# Patient Record
Sex: MALE | Race: BLACK OR AFRICAN AMERICAN | Employment: FULL TIME | URBAN - METROPOLITAN AREA
[De-identification: names, ages, dates, MRNs, and addresses within clinical notes are randomized per-mention and may not be internally consistent; named-entity substitution may affect disease eponyms.]

---

## 2018-12-13 ENCOUNTER — HOSPITAL ENCOUNTER (EMERGENCY)
Age: 23
Discharge: HOME | End: 2018-12-13

## 2018-12-13 VITALS
TEMPERATURE: 98.1 F | DIASTOLIC BLOOD PRESSURE: 58 MMHG | HEIGHT: 72 IN | RESPIRATION RATE: 20 BRPM | WEIGHT: 161.16 LBS | HEART RATE: 93 BPM | SYSTOLIC BLOOD PRESSURE: 116 MMHG | BODY MASS INDEX: 21.83 KG/M2 | OXYGEN SATURATION: 99 %

## 2018-12-13 DIAGNOSIS — G40.909 SEIZURE DISORDER (HCC): Primary | ICD-10-CM

## 2018-12-13 PROCEDURE — 99284 EMERGENCY DEPT VISIT MOD MDM: CPT

## 2018-12-13 RX ORDER — DIVALPROEX SODIUM 500 MG/1
1500 TABLET, DELAYED RELEASE ORAL DAILY
Qty: 90 TABLET | Refills: 2 | Status: SHIPPED | OUTPATIENT
Start: 2018-12-13 | End: 2019-04-17 | Stop reason: ALTCHOICE

## 2018-12-13 RX ORDER — DIVALPROEX SODIUM 500 MG/1
1500 TABLET, DELAYED RELEASE ORAL DAILY
COMMUNITY
End: 2018-12-13

## 2018-12-13 ASSESSMENT — PAIN DESCRIPTION - DESCRIPTORS: DESCRIPTORS: ACHING

## 2018-12-13 ASSESSMENT — PAIN DESCRIPTION - PROGRESSION: CLINICAL_PROGRESSION: NOT CHANGED

## 2018-12-13 ASSESSMENT — PAIN DESCRIPTION - ONSET: ONSET: SUDDEN

## 2018-12-13 ASSESSMENT — PAIN SCALES - GENERAL
PAINLEVEL_OUTOF10: 6
PAINLEVEL_OUTOF10: 8

## 2018-12-13 ASSESSMENT — PAIN DESCRIPTION - LOCATION: LOCATION: HEAD

## 2018-12-13 ASSESSMENT — PAIN DESCRIPTION - FREQUENCY: FREQUENCY: CONTINUOUS

## 2018-12-13 NOTE — ED NOTES
Bed: A-16  Expected date: 12/13/18  Expected time: 9:20 AM  Means of arrival: [facility]  Comments:  [facility] [id number], RN  12/13/18 8698

## 2018-12-13 NOTE — ED NOTES
Pt to room 16 via ems with c/o seizure this morning witnessed by his roommate. EMS reports pt was drowsy en route, but awake at this time.        Gavin Ortiz RN  12/13/18 1363

## 2018-12-14 ASSESSMENT — ENCOUNTER SYMPTOMS
GASTROINTESTINAL NEGATIVE: 1
RESPIRATORY NEGATIVE: 1

## 2019-01-07 ENCOUNTER — APPOINTMENT (OUTPATIENT)
Dept: CT IMAGING | Age: 24
End: 2019-01-07

## 2019-01-07 ENCOUNTER — HOSPITAL ENCOUNTER (EMERGENCY)
Age: 24
Discharge: HOME | End: 2019-01-07
Attending: EMERGENCY MEDICINE

## 2019-01-07 VITALS
SYSTOLIC BLOOD PRESSURE: 122 MMHG | BODY MASS INDEX: 22.28 KG/M2 | RESPIRATION RATE: 16 BRPM | DIASTOLIC BLOOD PRESSURE: 65 MMHG | HEART RATE: 57 BPM | WEIGHT: 164.24 LBS | TEMPERATURE: 98.2 F | OXYGEN SATURATION: 100 %

## 2019-01-07 DIAGNOSIS — S02.92XA CLOSED FRACTURE OF FACIAL BONE DUE TO FALL, INITIAL ENCOUNTER (HCC): Primary | ICD-10-CM

## 2019-01-07 DIAGNOSIS — R56.9 SEIZURE (HCC): ICD-10-CM
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
## 2019-01-07 PROCEDURE — 99284 EMERGENCY DEPT VISIT MOD MDM: CPT

## 2019-01-07 PROCEDURE — 6370000000 HC RX 637 (ALT 250 FOR IP): Performed by: EMERGENCY MEDICINE

## 2019-01-07 PROCEDURE — 70486 CT MAXILLOFACIAL W/O DYE: CPT

## 2019-01-07 RX ORDER — IBUPROFEN 600 MG/1
600 TABLET ORAL EVERY 8 HOURS PRN
Qty: 30 TABLET | Refills: 0 | Status: SHIPPED | OUTPATIENT
Start: 2019-01-07 | End: 2019-04-17 | Stop reason: ALTCHOICE

## 2019-01-07 RX ORDER — DIVALPROEX SODIUM 250 MG/1
500 TABLET, DELAYED RELEASE ORAL ONCE
Status: DISCONTINUED | OUTPATIENT
Start: 2019-01-07 | End: 2019-01-07 | Stop reason: HOSPADM

## 2019-01-07 RX ADMIN — IBUPROFEN 600 MG: 200 TABLET, FILM COATED ORAL at 01:11

## 2019-01-07 ASSESSMENT — PAIN SCALES - GENERAL
PAINLEVEL_OUTOF10: 4
PAINLEVEL_OUTOF10: 7

## 2019-04-17 ENCOUNTER — APPOINTMENT (OUTPATIENT)
Dept: GENERAL RADIOLOGY | Age: 24
End: 2019-04-17

## 2019-04-17 ENCOUNTER — HOSPITAL ENCOUNTER (EMERGENCY)
Age: 24
Discharge: HOME | End: 2019-04-17

## 2019-04-17 VITALS
WEIGHT: 165.57 LBS | RESPIRATION RATE: 14 BRPM | OXYGEN SATURATION: 99 % | TEMPERATURE: 98.6 F | HEIGHT: 73 IN | SYSTOLIC BLOOD PRESSURE: 106 MMHG | BODY MASS INDEX: 21.94 KG/M2 | DIASTOLIC BLOOD PRESSURE: 65 MMHG | HEART RATE: 63 BPM

## 2019-04-17 DIAGNOSIS — S49.92XA SHOULDER INJURY, LEFT, INITIAL ENCOUNTER: ICD-10-CM

## 2019-04-17 DIAGNOSIS — S39.012A LUMBAR STRAIN, INITIAL ENCOUNTER: ICD-10-CM

## 2019-04-17 DIAGNOSIS — G40.919 BREAKTHROUGH SEIZURE (HCC): Primary | ICD-10-CM

## 2019-04-17 PROCEDURE — 6370000000 HC RX 637 (ALT 250 FOR IP): Performed by: PHYSICIAN ASSISTANT

## 2019-04-17 PROCEDURE — 73030 X-RAY EXAM OF SHOULDER: CPT

## 2019-04-17 PROCEDURE — 99283 EMERGENCY DEPT VISIT LOW MDM: CPT

## 2019-04-17 RX ORDER — IBUPROFEN 600 MG/1
600 TABLET ORAL EVERY 6 HOURS PRN
Qty: 20 TABLET | Refills: 0 | Status: SHIPPED | OUTPATIENT
Start: 2019-04-17

## 2019-04-17 RX ORDER — ZONISAMIDE 100 MG/1
100 CAPSULE ORAL DAILY
COMMUNITY
End: 2019-09-28 | Stop reason: ALTCHOICE

## 2019-04-17 RX ORDER — IBUPROFEN 600 MG/1
600 TABLET ORAL ONCE
Status: COMPLETED | OUTPATIENT
Start: 2019-04-17 | End: 2019-04-17

## 2019-04-17 RX ORDER — CYCLOBENZAPRINE HCL 10 MG
10 TABLET ORAL NIGHTLY PRN
Qty: 20 TABLET | Refills: 0 | Status: SHIPPED | OUTPATIENT
Start: 2019-04-17 | End: 2019-04-27

## 2019-04-17 RX ADMIN — IBUPROFEN 600 MG: 600 TABLET ORAL at 17:17

## 2019-04-17 ASSESSMENT — PAIN - FUNCTIONAL ASSESSMENT: PAIN_FUNCTIONAL_ASSESSMENT: 0-10

## 2019-04-17 ASSESSMENT — PAIN DESCRIPTION - ONSET: ONSET: SUDDEN

## 2019-04-17 ASSESSMENT — PAIN SCALES - GENERAL
PAINLEVEL_OUTOF10: 4
PAINLEVEL_OUTOF10: 4
PAINLEVEL_OUTOF10: 2
PAINLEVEL_OUTOF10: 2

## 2019-04-17 ASSESSMENT — PAIN DESCRIPTION - LOCATION
LOCATION: ARM;BACK;HEAD
LOCATION: ARM
LOCATION: HEAD;ARM;BACK

## 2019-04-17 ASSESSMENT — PAIN DESCRIPTION - DESCRIPTORS
DESCRIPTORS: ACHING
DESCRIPTORS: ACHING

## 2019-04-17 ASSESSMENT — PAIN DESCRIPTION - PROGRESSION
CLINICAL_PROGRESSION: NOT CHANGED
CLINICAL_PROGRESSION: GRADUALLY IMPROVING

## 2019-04-17 ASSESSMENT — PAIN DESCRIPTION - PAIN TYPE
TYPE: ACUTE PAIN
TYPE: ACUTE PAIN

## 2019-04-17 ASSESSMENT — PAIN DESCRIPTION - FREQUENCY: FREQUENCY: CONTINUOUS

## 2019-04-17 NOTE — ED PROVIDER NOTES
[address] [facility] [place] [address] [address] [id number]  Dept: [phone]  Loc: [address] ENCOUNTER    Evaluated by the Advanced Practice Provider      CHIEF COMPLAINT  Chief Complaint   Patient presents with    Seizures     Had a Seizure approximately 2 hours ago. Was alone and in the bed. Felt self shaking. Woke up with sore arms, head and back. States did not hit head on anything       HPI    Gary Mcmanus is a 26 y.o. male who presents with a seizure that occurred 2 hours prior to arrival.  The quality of the seizure was unknown. The duration of the seizure was unkown. The context was that the patient was lying around in bed and woke up with pain in his left shoulder and lower back. He states that he is pretty sure he had a seizure. He is currently taking Zonegran, states he has not missed any doses. He states that his been on Depakote in the past but was switched to [address] by his neurologist.  He states that he needs referral to adult neurologist as he is now 26.    REVIEW OF SYSTEMS    Neurologic: see HPI, No bowel or bladder incontinence, No saddle anesthesia, No leg weakness  Cardiac: No chest pain or palpitations  Respiratory: No shortness of breath or new cough  General: No fevers or chills  : No dysuria or hematuria  GI: No vomiting or diarrhea  Musculoskeletal: see HPI  All other systems reviewed and are negative. PAST MEDICAL OR SURGICAL HISTORY    Past Medical History:   Diagnosis Date    Seizures ([facility] [address]      History reviewed. No pertinent surgical history. CURRENT MEDICATIONS  (may include discharge medications prescribed in the ED)  Current Outpatient Rx   Medication Sig Dispense Refill    zonisamide (ZONEGRAN) 100 MG capsule Take 100 mg by mouth daily         ALLERGIES    Allergies   Allergen Reactions    Amoxicillin Rash       FAMILY OR SOCIAL HISTORY    History reviewed.  No pertinent family history.   Social History     Socioeconomic History    Marital status: Single     Spouse name: None    Number of children: None    Years of education: None    Highest education level: None   Occupational History    None   Social Needs    Financial resource strain: None    Food insecurity:     Worry: None     Inability: None    Transportation needs:     Medical: None     Non-medical: None   Tobacco Use    Smoking status: Never Smoker    Smokeless tobacco: Never Used   Substance and Sexual Activity    Alcohol use: No    Drug use: Not Currently    Sexual activity: Yes     Partners: Female   Lifestyle    Physical activity:     Days per week: None     Minutes per session: None    Stress: None   Relationships    Social connections:     Talks on phone: None     Gets together: None     Attends Sikhism service: None     Active member of club or organization: None     Attends meetings of clubs or organizations: None     Relationship status: None    Intimate partner violence:     Fear of current or ex partner: None     Emotionally abused: None     Physically abused: None     Forced sexual activity: None   Other Topics Concern    None   Social History Narrative    None       PHYSICAL EXAM    VITAL SIGNS: /81   Pulse 78   Temp 98.2 °F (36.8 °C) (Oral)   Resp 14   Ht 6' 1\" (1.854 m)   Wt 165 lb 9.1 oz (75.1 kg)   SpO2 100%   BMI 21.84 kg/m²   Constitutional:  Well developed, well nourished, no acute distress  Eyes:  Pupils equally round and reactive to light, sclera nonicteric  HENT:  External ears normal, nose normal, oropharynx moist, tongue normal  NECK: Normal range of motion, no tenderness  Respiratory: Lungs clear to auscultation bilaterally, no respiratory distress, no wheezing   Cardiovascular:  regular rate, regular rhythm, no murmurs   GI:  Soft, nondistended, normal bowel sounds, nontender  Musculoskeletal:  Exam of the affected shoulder reveals mild tenderness to palpation over the deltoid, no obvious deformity, passive and active range of motion intact. No increased warmth or joint effusion    Back: +mild bilateral lumbar paraspinous tenderness to palpation  Integument:  Skin is warm and dry, no obvious rash   Neurologic: Awake, alert, oriented, no aphasia, no slurred speech, CN II-XII intact, normal finger to nose test bilaterally, intact thigh adduction, knee flexion and extension, ankle dorsiflexion, toe plantarflexion, and great toe dorsiflexion bilaterally; patellar reflexes are 2+ and equal bilaterally, sensation to light touch is intact in the groin and lower extremities bilaterally; axillary nerve is intact and the median/ulnar/radial nerve sensory and motor distributions are intact on the affected side  Vascular: Radial and DP pulses 2+ and equal bilaterally  Psych: Pleasant affect, no hallucinations    RADIOLOGY  XR SHOULDER LEFT (MIN 2 VIEWS)   Final Result   No acute abnormality. ED COURSE & MEDICAL DECISION MAKING    Pertinent Labs & Imaging studies reviewed and interpreted. (See chart for details)  See chart for details of medications given during the ED stay. Differential diagnosis: status epilepticus, breakthrough seizure, intracranial bleed, brain tumor, hypoglycemia, neck fracture or other orthopedic fractures, posterior shoulder dislocation, tongue laceration, other; Cauda Equina Syndrome, Spinal Cord Compression, Conus Medullaris Syndrome, ruptured/dissecting Abdominal Aortic Aneurysm, Fracture or dislocation, other; rotator cuff sprain/tear/rupture, clavicle fracture, humerus fracture, scapular fracture, posterior or anterior glenohumeral joint dislocation, glenohumeral subluxation, AC joint separation, brachioplexus injury or other peripheral nerve injury, cervical spine neurologic or mechanical bony injury, left apical pneumothorax, arterial injury/Ischemia, Infection, other    Patient is afebrile and nontoxic in appearance. Neuro exam unremarkable. Patient states he is taking his Zonegran as prescribed. Had a breakthrough seizure today. Due to the absence of neurological deficit, I have low suspicion at this time for epidural compression syndrome. Patient's back pain is most likely due to muscular strain. Plain films of the shoulder as above. If symptoms do not improve, patient will need further evaluation by orthopedics for possible rotator cuff tear. A sling was placed in the emergency department, I instructed the patient on intermittent use to avoid frozen shoulder. Consultation with neurology at 6:30 PM: I spoke with Dr. Ze Rowe, and he said the patient can call the office to schedule an appointment. Reevaluation at 6:35 PM: Patient is resting comfortably. I believe the patient is safe for discharge at this time. The patient was instructed to follow up as an outpatient in 2 days. The patient was instructed to return to the ED immediately for any new or worsening symptoms. The patient verbalized understanding. I have evaluated this patient. My supervising physician was available for consultation. FINAL IMPRESSION    1. Breakthrough seizure ([address]    2. Shoulder injury, left, initial encounter    3.  Lumbar strain, initial encounter        PLAN  Discharge with outpatient follow-up (see EMR)    (Please note that this note was completed with a voice recognition program.  Every attempt was made to edit the dictations, but inevitably there remain words that are mis-transcribed.)          Rayna Gomesma  04/17/19 8839

## 2019-09-28 ENCOUNTER — HOSPITAL ENCOUNTER (EMERGENCY)
Age: 24
Discharge: HOME | End: 2019-09-28
Attending: EMERGENCY MEDICINE

## 2019-09-28 VITALS
BODY MASS INDEX: 22.99 KG/M2 | SYSTOLIC BLOOD PRESSURE: 113 MMHG | TEMPERATURE: 99.4 F | WEIGHT: 173.5 LBS | HEIGHT: 73 IN | OXYGEN SATURATION: 99 % | RESPIRATION RATE: 18 BRPM | HEART RATE: 102 BPM | DIASTOLIC BLOOD PRESSURE: 67 MMHG

## 2019-09-28 DIAGNOSIS — G40.919 BREAKTHROUGH SEIZURE (HCC): Primary | ICD-10-CM

## 2019-09-28 LAB — VALPROIC ACID LEVEL: <2.8 UG/ML (ref 50–100)

## 2019-09-28 PROCEDURE — 36415 COLL VENOUS BLD VENIPUNCTURE: CPT

## 2019-09-28 PROCEDURE — 99284 EMERGENCY DEPT VISIT MOD MDM: CPT

## 2019-09-28 PROCEDURE — 6370000000 HC RX 637 (ALT 250 FOR IP): Performed by: EMERGENCY MEDICINE

## 2019-09-28 PROCEDURE — 80164 ASSAY DIPROPYLACETIC ACD TOT: CPT

## 2019-09-28 RX ORDER — DIVALPROEX SODIUM 500 MG/1
1000 TABLET, EXTENDED RELEASE ORAL
COMMUNITY
Start: 2018-07-19 | End: 2019-12-19 | Stop reason: SDUPTHER

## 2019-09-28 RX ORDER — LORAZEPAM 1 MG/1
1 TABLET ORAL ONCE
Status: COMPLETED | OUTPATIENT
Start: 2019-09-28 | End: 2019-09-28

## 2019-09-28 RX ORDER — VALPROIC ACID 250 MG/1
1000 CAPSULE, LIQUID FILLED ORAL ONCE
Status: COMPLETED | OUTPATIENT
Start: 2019-09-28 | End: 2019-09-28

## 2019-09-28 RX ADMIN — LORAZEPAM 1 MG: 1 TABLET ORAL at 17:18

## 2019-09-28 RX ADMIN — VALPROIC ACID 1000 MG: 250 CAPSULE, LIQUID FILLED ORAL at 18:20

## 2019-09-28 NOTE — ED PROVIDER NOTES
DIFFERENTIAL DIAGNOSIS/MDM:   Vitals:    Vitals:    09/28/19 1700   BP: (!) 118/49   Pulse: 102   Resp: 18   Temp: 99.4 °F (37.4 °C)   TempSrc: Oral   SpO2: 94%   Weight: 173 lb 8 oz (78.7 kg)   Height: 6' 1\" (1.854 m)       This patient has a known seizure disorder. He is on Depakote. He admits that he has not been as compliant with his medication as he should, stating that he is missed medication for \"several days\". He has an abrasion but no other injuries from today's seizure. He was awake alert and oriented when he arrived here. He states he took a dose of Depakote this morning. He is on the thousand milligrams a day. His Depakote level was less than 2.8. He was subtherapeutic on his previous visits here as well. I discussed with the patient compliance with medication for control of his seizures. He was given 2000 mg p.o. here. I will take his next dose tonight. I referred him to [facility] neurology, he had been previously followed at [facility].  He understands he needs to call for follow-up appointment. He understands he will need a repeat Depakote level with possible adjustments in his medication dosage. He understands that he should not drive or perform any other dangerous activities until his seizures are controlled and he is released by neurology. Test results, diagnosis, and treatment plan were discussed with the patient. Patient understands the treatment plan and follow-up as discussed. CONSULTS:  None    PROCEDURES:  None    FINAL IMPRESSION      1.  Breakthrough seizure [facility])          DISPOSITION/PLAN   DISPOSITION Decision To Discharge 09/28/2019 06:09:15 PM      PATIENT REFERRED TO:  [address] [place] [address] [address] Neurology  [phone]  In 1 week        DISCHARGE MEDICATIONS:  New Prescriptions    No medications on file       (Please note that portions of this note were completed with a voice recognition program.  Efforts were made to edit the dictations but occasionally words are mis-transcribed.)    Lalo Kennedy MD  Attending Emergency Physician        Roxy Solano MD  09/28/19 8117

## 2019-12-19 ENCOUNTER — APPOINTMENT (OUTPATIENT)
Dept: GENERAL RADIOLOGY | Age: 24
End: 2019-12-19

## 2019-12-19 ENCOUNTER — HOSPITAL ENCOUNTER (EMERGENCY)
Age: 24
Discharge: HOME | End: 2019-12-19
Attending: EMERGENCY MEDICINE

## 2019-12-19 VITALS
DIASTOLIC BLOOD PRESSURE: 59 MMHG | WEIGHT: 175.71 LBS | OXYGEN SATURATION: 96 % | SYSTOLIC BLOOD PRESSURE: 109 MMHG | BODY MASS INDEX: 23.29 KG/M2 | HEIGHT: 73 IN | TEMPERATURE: 98.6 F | HEART RATE: 90 BPM | RESPIRATION RATE: 14 BRPM

## 2019-12-19 DIAGNOSIS — R56.9 SEIZURE SECONDARY TO SUBTHERAPEUTIC ANTICONVULSANT MEDICATION (HCC): Primary | ICD-10-CM

## 2019-12-19 DIAGNOSIS — Z79.899 SEIZURE SECONDARY TO SUBTHERAPEUTIC ANTICONVULSANT MEDICATION (HCC): Primary | ICD-10-CM

## 2019-12-19 LAB
ANION GAP SERPL CALCULATED.3IONS-SCNC: 13 MMOL/L (ref 3–16)
BASOPHILS ABSOLUTE: 0 K/UL (ref 0–0.2)
BASOPHILS RELATIVE PERCENT: 0.4 %
BUN BLDV-MCNC: 7 MG/DL (ref 7–20)
CALCIUM SERPL-MCNC: 9.4 MG/DL (ref 8.3–10.6)
CHLORIDE BLD-SCNC: 100 MMOL/L (ref 99–110)
CO2: 24 MMOL/L (ref 21–32)
CREAT SERPL-MCNC: 0.9 MG/DL (ref 0.9–1.3)
EOSINOPHILS ABSOLUTE: 0.1 K/UL (ref 0–0.6)
EOSINOPHILS RELATIVE PERCENT: 0.7 %
GFR AFRICAN AMERICAN: >60
GFR NON-AFRICAN AMERICAN: >60
GLUCOSE BLD-MCNC: 86 MG/DL (ref 70–99)
HCT VFR BLD CALC: 42.7 % (ref 40.5–52.5)
HEMOGLOBIN: 14.5 G/DL (ref 13.5–17.5)
LYMPHOCYTES ABSOLUTE: 1.4 K/UL (ref 1–5.1)
LYMPHOCYTES RELATIVE PERCENT: 17 %
MCH RBC QN AUTO: 33.8 PG (ref 26–34)
MCHC RBC AUTO-ENTMCNC: 34 G/DL (ref 31–36)
MCV RBC AUTO: 99.4 FL (ref 80–100)
MONOCYTES ABSOLUTE: 0.8 K/UL (ref 0–1.3)
MONOCYTES RELATIVE PERCENT: 9.8 %
NEUTROPHILS ABSOLUTE: 5.7 K/UL (ref 1.7–7.7)
NEUTROPHILS RELATIVE PERCENT: 72.1 %
PDW BLD-RTO: 13.1 % (ref 12.4–15.4)
PLATELET # BLD: 192 K/UL (ref 135–450)
PMV BLD AUTO: 9 FL (ref 5–10.5)
POTASSIUM REFLEX MAGNESIUM: 3.7 MMOL/L (ref 3.5–5.1)
RBC # BLD: 4.29 M/UL (ref 4.2–5.9)
SODIUM BLD-SCNC: 137 MMOL/L (ref 136–145)
VALPROIC ACID LEVEL: <2.8 UG/ML (ref 50–100)
WBC # BLD: 8 K/UL (ref 4–11)

## 2019-12-19 PROCEDURE — 73030 X-RAY EXAM OF SHOULDER: CPT

## 2019-12-19 PROCEDURE — 80048 BASIC METABOLIC PNL TOTAL CA: CPT

## 2019-12-19 PROCEDURE — 80164 ASSAY DIPROPYLACETIC ACD TOT: CPT

## 2019-12-19 PROCEDURE — 85025 COMPLETE CBC W/AUTO DIFF WBC: CPT

## 2019-12-19 PROCEDURE — 6370000000 HC RX 637 (ALT 250 FOR IP): Performed by: EMERGENCY MEDICINE

## 2019-12-19 PROCEDURE — 99284 EMERGENCY DEPT VISIT MOD MDM: CPT

## 2019-12-19 RX ORDER — VALPROIC ACID 250 MG/1
1000 CAPSULE, LIQUID FILLED ORAL ONCE
Status: COMPLETED | OUTPATIENT
Start: 2019-12-19 | End: 2019-12-19

## 2019-12-19 RX ORDER — DIVALPROEX SODIUM 500 MG/1
1000 TABLET, EXTENDED RELEASE ORAL DAILY
Qty: 60 TABLET | Refills: 0 | Status: SHIPPED | OUTPATIENT
Start: 2019-12-19

## 2019-12-19 RX ADMIN — VALPROIC ACID 1000 MG: 250 CAPSULE, LIQUID FILLED ORAL at 10:05

## 2019-12-19 ASSESSMENT — PAIN DESCRIPTION - LOCATION: LOCATION: HEAD

## 2019-12-19 ASSESSMENT — PAIN DESCRIPTION - FREQUENCY: FREQUENCY: CONTINUOUS

## 2019-12-19 ASSESSMENT — PAIN DESCRIPTION - ONSET: ONSET: ON-GOING

## 2019-12-19 ASSESSMENT — PAIN DESCRIPTION - PROGRESSION: CLINICAL_PROGRESSION: NOT CHANGED

## 2019-12-19 ASSESSMENT — PAIN DESCRIPTION - DESCRIPTORS: DESCRIPTORS: CONSTANT

## 2019-12-19 ASSESSMENT — PAIN DESCRIPTION - ORIENTATION: ORIENTATION: MID

## 2019-12-19 ASSESSMENT — PAIN SCALES - GENERAL: PAINLEVEL_OUTOF10: 4

## 2019-12-19 ASSESSMENT — PAIN DESCRIPTION - PAIN TYPE: TYPE: ACUTE PAIN

## 2019-12-19 ASSESSMENT — PAIN - FUNCTIONAL ASSESSMENT: PAIN_FUNCTIONAL_ASSESSMENT: ACTIVITIES ARE NOT PREVENTED

## 2021-03-31 ENCOUNTER — APPOINTMENT (OUTPATIENT)
Dept: GENERAL RADIOLOGY | Age: 26
End: 2021-03-31

## 2021-03-31 ENCOUNTER — APPOINTMENT (OUTPATIENT)
Dept: CT IMAGING | Age: 26
End: 2021-03-31

## 2021-03-31 ENCOUNTER — HOSPITAL ENCOUNTER (EMERGENCY)
Age: 26
Discharge: HOME | End: 2021-03-31
Attending: EMERGENCY MEDICINE

## 2021-03-31 VITALS
HEART RATE: 74 BPM | HEIGHT: 73 IN | SYSTOLIC BLOOD PRESSURE: 121 MMHG | WEIGHT: 174.5 LBS | RESPIRATION RATE: 16 BRPM | DIASTOLIC BLOOD PRESSURE: 72 MMHG | OXYGEN SATURATION: 99 % | BODY MASS INDEX: 23.13 KG/M2 | TEMPERATURE: 97 F
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
## 2021-03-31 DIAGNOSIS — S02.609A CLOSED FRACTURE OF LEFT SIDE OF MANDIBLE, UNSPECIFIED MANDIBULAR SITE, INITIAL ENCOUNTER (HCC): Primary | ICD-10-CM

## 2021-03-31 LAB
ANION GAP SERPL CALCULATED.3IONS-SCNC: 9 MMOL/L (ref 3–16)
BASOPHILS ABSOLUTE: 0 K/UL (ref 0–0.2)
BASOPHILS RELATIVE PERCENT: 0.3 %
BUN BLDV-MCNC: 9 MG/DL (ref 7–20)
CALCIUM SERPL-MCNC: 9 MG/DL (ref 8.3–10.6)
CHLORIDE BLD-SCNC: 103 MMOL/L (ref 99–110)
CO2: 25 MMOL/L (ref 21–32)
CREAT SERPL-MCNC: 0.7 MG/DL (ref 0.9–1.3)
EOSINOPHILS ABSOLUTE: 0 K/UL (ref 0–0.6)
EOSINOPHILS RELATIVE PERCENT: 0.4 %
GFR AFRICAN AMERICAN: >60
GFR NON-AFRICAN AMERICAN: >60
GLUCOSE BLD-MCNC: 115 MG/DL (ref 70–99)
HCT VFR BLD CALC: 43.1 % (ref 40.5–52.5)
HEMOGLOBIN: 14.6 G/DL (ref 13.5–17.5)
LYMPHOCYTES ABSOLUTE: 1.8 K/UL (ref 1–5.1)
LYMPHOCYTES RELATIVE PERCENT: 26.5 %
MCH RBC QN AUTO: 34.1 PG (ref 26–34)
MCHC RBC AUTO-ENTMCNC: 33.9 G/DL (ref 31–36)
MCV RBC AUTO: 100.5 FL (ref 80–100)
MONOCYTES ABSOLUTE: 0.9 K/UL (ref 0–1.3)
MONOCYTES RELATIVE PERCENT: 12.4 %
NEUTROPHILS ABSOLUTE: 4.2 K/UL (ref 1.7–7.7)
NEUTROPHILS RELATIVE PERCENT: 60.4 %
PDW BLD-RTO: 13.3 % (ref 12.4–15.4)
PLATELET # BLD: 197 K/UL (ref 135–450)
PMV BLD AUTO: 8.6 FL (ref 5–10.5)
POTASSIUM REFLEX MAGNESIUM: 3.8 MMOL/L (ref 3.5–5.1)
RBC # BLD: 4.28 M/UL (ref 4.2–5.9)
SARS-COV-2, NAAT: NOT DETECTED
SODIUM BLD-SCNC: 137 MMOL/L (ref 136–145)
WBC # BLD: 7 K/UL (ref 4–11)

## 2021-03-31 PROCEDURE — 6360000002 HC RX W HCPCS: Performed by: PHYSICIAN ASSISTANT

## 2021-03-31 PROCEDURE — 96375 TX/PRO/DX INJ NEW DRUG ADDON: CPT

## 2021-03-31 PROCEDURE — 99284 EMERGENCY DEPT VISIT MOD MDM: CPT

## 2021-03-31 PROCEDURE — 80048 BASIC METABOLIC PNL TOTAL CA: CPT

## 2021-03-31 PROCEDURE — 85025 COMPLETE CBC W/AUTO DIFF WBC: CPT

## 2021-03-31 PROCEDURE — 87635 SARS-COV-2 COVID-19 AMP PRB: CPT

## 2021-03-31 PROCEDURE — 99283 EMERGENCY DEPT VISIT LOW MDM: CPT

## 2021-03-31 PROCEDURE — 36415 COLL VENOUS BLD VENIPUNCTURE: CPT

## 2021-03-31 PROCEDURE — 96374 THER/PROPH/DIAG INJ IV PUSH: CPT

## 2021-03-31 PROCEDURE — 70100 X-RAY EXAM OF JAW <4VIEWS: CPT

## 2021-03-31 PROCEDURE — 70486 CT MAXILLOFACIAL W/O DYE: CPT

## 2021-03-31 PROCEDURE — 2500000003 HC RX 250 WO HCPCS: Performed by: PHYSICIAN ASSISTANT

## 2021-03-31 RX ORDER — CLINDAMYCIN HYDROCHLORIDE 300 MG/1
300 CAPSULE ORAL 4 TIMES DAILY
Qty: 40 CAPSULE | Refills: 0 | Status: SHIPPED | OUTPATIENT
Start: 2021-03-31 | End: 2021-04-10

## 2021-03-31 RX ORDER — CLINDAMYCIN PHOSPHATE 600 MG/50ML
600 INJECTION INTRAVENOUS ONCE
Status: COMPLETED | OUTPATIENT
Start: 2021-03-31 | End: 2021-03-31

## 2021-03-31 RX ORDER — MORPHINE SULFATE 2 MG/ML
2 INJECTION, SOLUTION INTRAMUSCULAR; INTRAVENOUS ONCE
Status: COMPLETED | OUTPATIENT
Start: 2021-03-31 | End: 2021-03-31

## 2021-03-31 RX ORDER — ONDANSETRON 2 MG/ML
4 INJECTION INTRAMUSCULAR; INTRAVENOUS ONCE
Status: COMPLETED | OUTPATIENT
Start: 2021-03-31 | End: 2021-03-31

## 2021-03-31 RX ORDER — HYDROCODONE BITARTRATE AND ACETAMINOPHEN 5; 325 MG/1; MG/1
1 TABLET ORAL EVERY 6 HOURS PRN
Qty: 10 TABLET | Refills: 0 | Status: SHIPPED | OUTPATIENT
Start: 2021-03-31 | End: 2021-04-03

## 2021-03-31 RX ADMIN — MORPHINE SULFATE 2 MG: 2 INJECTION, SOLUTION INTRAMUSCULAR; INTRAVENOUS at 17:45

## 2021-03-31 RX ADMIN — CLINDAMYCIN PHOSPHATE 600 MG: 600 INJECTION, SOLUTION INTRAVENOUS at 18:16

## 2021-03-31 RX ADMIN — ONDANSETRON 4 MG: 2 INJECTION INTRAMUSCULAR; INTRAVENOUS at 18:17

## 2021-03-31 ASSESSMENT — PAIN SCALES - WONG BAKER: WONGBAKER_NUMERICALRESPONSE: 8

## 2021-03-31 ASSESSMENT — ENCOUNTER SYMPTOMS
FACIAL SWELLING: 1
COLOR CHANGE: 0
CHOKING: 0
VOICE CHANGE: 0
SHORTNESS OF BREATH: 0
TROUBLE SWALLOWING: 0

## 2021-03-31 ASSESSMENT — PAIN DESCRIPTION - ORIENTATION: ORIENTATION: LEFT

## 2021-03-31 ASSESSMENT — PAIN DESCRIPTION - LOCATION: LOCATION: JAW

## 2021-03-31 ASSESSMENT — PAIN DESCRIPTION - PROGRESSION: CLINICAL_PROGRESSION: GRADUALLY IMPROVING

## 2021-03-31 ASSESSMENT — PAIN DESCRIPTION - ONSET
ONSET: SUDDEN
ONSET: SUDDEN

## 2021-03-31 ASSESSMENT — PAIN SCALES - GENERAL
PAINLEVEL_OUTOF10: 5
PAINLEVEL_OUTOF10: 8

## 2021-03-31 ASSESSMENT — PAIN DESCRIPTION - DESCRIPTORS: DESCRIPTORS: CONSTANT

## 2021-03-31 ASSESSMENT — PAIN DESCRIPTION - FREQUENCY: FREQUENCY: CONTINUOUS

## 2021-03-31 ASSESSMENT — PAIN - FUNCTIONAL ASSESSMENT: PAIN_FUNCTIONAL_ASSESSMENT: PREVENTS OR INTERFERES SOME ACTIVE ACTIVITIES AND ADLS
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
## 2024-10-23 ENCOUNTER — APPOINTMENT (OUTPATIENT)
Dept: GENERAL RADIOLOGY | Age: 29
End: 2024-10-23

## 2024-10-23 ENCOUNTER — HOSPITAL ENCOUNTER (EMERGENCY)
Age: 29
Discharge: HOME | End: 2024-10-24

## 2024-10-23 DIAGNOSIS — Z79.899 SEIZURE SECONDARY TO SUBTHERAPEUTIC ANTICONVULSANT MEDICATION (HCC): Primary | ICD-10-CM

## 2024-10-23 DIAGNOSIS — S61.411A LACERATION OF RIGHT HAND INVOLVING TENDON, INITIAL ENCOUNTER: ICD-10-CM

## 2024-10-23 DIAGNOSIS — R56.9 SEIZURE SECONDARY TO SUBTHERAPEUTIC ANTICONVULSANT MEDICATION (HCC): Primary | ICD-10-CM

## 2024-10-23 DIAGNOSIS — S01.81XA FACIAL LACERATION, INITIAL ENCOUNTER: ICD-10-CM

## 2024-10-23 DIAGNOSIS — S66.921A LACERATION OF RIGHT HAND INVOLVING TENDON, INITIAL ENCOUNTER: ICD-10-CM

## 2024-10-23 LAB
BASOPHILS # BLD: 0 K/UL (ref 0–0.2)
BASOPHILS NFR BLD: 0.6 %
DEPRECATED RDW RBC AUTO: 13.2 % (ref 12.4–15.4)
EOSINOPHIL # BLD: 0.1 K/UL (ref 0–0.6)
EOSINOPHIL NFR BLD: 1.9 %
HCT VFR BLD AUTO: 39.6 % (ref 40.5–52.5)
HGB BLD-MCNC: 13.8 G/DL (ref 13.5–17.5)
LYMPHOCYTES # BLD: 2.4 K/UL (ref 1–5.1)
LYMPHOCYTES NFR BLD: 39.9 %
MCH RBC QN AUTO: 35.2 PG (ref 26–34)
MCHC RBC AUTO-ENTMCNC: 34.8 G/DL (ref 31–36)
MCV RBC AUTO: 101.2 FL (ref 80–100)
MONOCYTES # BLD: 0.6 K/UL (ref 0–1.3)
MONOCYTES NFR BLD: 10.2 %
NEUTROPHILS # BLD: 2.8 K/UL (ref 1.7–7.7)
NEUTROPHILS NFR BLD: 47.4 %
PLATELET # BLD AUTO: 209 K/UL (ref 135–450)
PMV BLD AUTO: 8.3 FL (ref 5–10.5)
RBC # BLD AUTO: 3.92 M/UL (ref 4.2–5.9)
WBC # BLD AUTO: 5.9 K/UL (ref 4–11)

## 2024-10-23 PROCEDURE — 80164 ASSAY DIPROPYLACETIC ACD TOT: CPT

## 2024-10-23 PROCEDURE — 12042 INTMD RPR N-HF/GENIT2.6-7.5: CPT

## 2024-10-23 PROCEDURE — 81001 URINALYSIS AUTO W/SCOPE: CPT

## 2024-10-23 PROCEDURE — 73110 X-RAY EXAM OF WRIST: CPT

## 2024-10-23 PROCEDURE — 6370000000 HC RX 637 (ALT 250 FOR IP)

## 2024-10-23 PROCEDURE — 80053 COMPREHEN METABOLIC PANEL: CPT

## 2024-10-23 PROCEDURE — 6360000002 HC RX W HCPCS

## 2024-10-23 PROCEDURE — 85025 COMPLETE CBC W/AUTO DIFF WBC: CPT

## 2024-10-23 PROCEDURE — 90471 IMMUNIZATION ADMIN: CPT

## 2024-10-23 PROCEDURE — 90715 TDAP VACCINE 7 YRS/> IM: CPT

## 2024-10-23 PROCEDURE — 83605 ASSAY OF LACTIC ACID: CPT

## 2024-10-23 PROCEDURE — 93005 ELECTROCARDIOGRAM TRACING: CPT

## 2024-10-23 PROCEDURE — 99285 EMERGENCY DEPT VISIT HI MDM: CPT

## 2024-10-23 PROCEDURE — 12052 INTMD RPR FACE/MM 2.6-5.0 CM: CPT

## 2024-10-23 RX ORDER — DIVALPROEX SODIUM 250 MG/1
500 TABLET, DELAYED RELEASE ORAL ONCE
Status: COMPLETED | OUTPATIENT
Start: 2024-10-23 | End: 2024-10-23

## 2024-10-23 RX ADMIN — TETANUS TOXOID, REDUCED DIPHTHERIA TOXOID AND ACELLULAR PERTUSSIS VACCINE, ADSORBED 0.5 ML: 5; 2.5; 8; 8; 2.5 SUSPENSION INTRAMUSCULAR at 23:44

## 2024-10-23 RX ADMIN — DIVALPROEX SODIUM 500 MG: 250 TABLET, DELAYED RELEASE ORAL at 23:44

## 2024-10-23 ASSESSMENT — LIFESTYLE VARIABLES
HOW MANY STANDARD DRINKS CONTAINING ALCOHOL DO YOU HAVE ON A TYPICAL DAY: 1 OR 2
HOW OFTEN DO YOU HAVE A DRINK CONTAINING ALCOHOL: MONTHLY OR LESS

## 2024-10-23 ASSESSMENT — PAIN - FUNCTIONAL ASSESSMENT: PAIN_FUNCTIONAL_ASSESSMENT: NONE - DENIES PAIN

## 2024-10-24 ENCOUNTER — TELEPHONE (OUTPATIENT)
Dept: ORTHOPEDIC SURGERY | Age: 29
End: 2024-10-24

## 2024-10-24 VITALS
OXYGEN SATURATION: 97 % | HEART RATE: 75 BPM | RESPIRATION RATE: 18 BRPM | SYSTOLIC BLOOD PRESSURE: 117 MMHG | WEIGHT: 183.64 LBS | BODY MASS INDEX: 23.57 KG/M2 | HEIGHT: 74 IN | TEMPERATURE: 99 F | DIASTOLIC BLOOD PRESSURE: 78 MMHG

## 2024-10-24 LAB
ALBUMIN SERPL-MCNC: 4 G/DL (ref 3.4–5)
ALBUMIN/GLOB SERPL: 1.5 {RATIO} (ref 1.1–2.2)
ALP SERPL-CCNC: 66 U/L (ref 40–129)
ALT SERPL-CCNC: 16 U/L (ref 10–40)
ANION GAP SERPL CALCULATED.3IONS-SCNC: 11 MMOL/L (ref 3–16)
AST SERPL-CCNC: 24 U/L (ref 15–37)
BACTERIA URNS QL MICRO: ABNORMAL /HPF
BILIRUB SERPL-MCNC: 0.5 MG/DL (ref 0–1)
BILIRUB UR QL STRIP.AUTO: NEGATIVE
BUN SERPL-MCNC: 8 MG/DL (ref 7–20)
CALCIUM SERPL-MCNC: 9.1 MG/DL (ref 8.3–10.6)
CHARACTER UR: ABNORMAL
CHLORIDE SERPL-SCNC: 104 MMOL/L (ref 99–110)
CLARITY UR: ABNORMAL
CO2 SERPL-SCNC: 24 MMOL/L (ref 21–32)
COLOR UR: YELLOW
CREAT SERPL-MCNC: 0.9 MG/DL (ref 0.9–1.3)
EKG ATRIAL RATE: 88 BPM
EKG DIAGNOSIS: NORMAL
EKG P AXIS: 34 DEGREES
EKG P-R INTERVAL: 148 MS
EKG Q-T INTERVAL: 362 MS
EKG QRS DURATION: 98 MS
EKG QTC CALCULATION (BAZETT): 438 MS
EKG R AXIS: 51 DEGREES
EKG T AXIS: 46 DEGREES
EKG VENTRICULAR RATE: 88 BPM
EPI CELLS #/AREA URNS HPF: ABNORMAL /HPF (ref 0–5)
GFR SERPLBLD CREATININE-BSD FMLA CKD-EPI: >90 ML/MIN/{1.73_M2}
GLUCOSE SERPL-MCNC: 92 MG/DL (ref 70–99)
GLUCOSE UR STRIP.AUTO-MCNC: NEGATIVE MG/DL
HGB UR QL STRIP.AUTO: ABNORMAL
KETONES UR STRIP.AUTO-MCNC: ABNORMAL MG/DL
LACTATE BLDV-SCNC: 3.6 MMOL/L (ref 0.4–2)
LEUKOCYTE ESTERASE UR QL STRIP.AUTO: NEGATIVE
NITRITE UR QL STRIP.AUTO: NEGATIVE
PH UR STRIP.AUTO: 7 [PH] (ref 5–8)
POTASSIUM SERPL-SCNC: 3.7 MMOL/L (ref 3.5–5.1)
PROT SERPL-MCNC: 6.7 G/DL (ref 6.4–8.2)
PROT UR STRIP.AUTO-MCNC: 30 MG/DL
RBC #/AREA URNS HPF: ABNORMAL /HPF (ref 0–4)
SODIUM SERPL-SCNC: 139 MMOL/L (ref 136–145)
SP GR UR STRIP.AUTO: 1.02 (ref 1–1.03)
UA COMPLETE W REFLEX CULTURE PNL UR: ABNORMAL
UA DIPSTICK W REFLEX MICRO PNL UR: YES
URN SPEC COLLECT METH UR: ABNORMAL
UROBILINOGEN UR STRIP-ACNC: 0.2 E.U./DL
VALPROATE SERPL-MCNC: 3 UG/ML (ref 50–100)
WBC #/AREA URNS HPF: ABNORMAL /HPF (ref 0–5)

## 2024-10-24 PROCEDURE — 93010 ELECTROCARDIOGRAM REPORT: CPT | Performed by: INTERNAL MEDICINE

## 2024-10-24 PROCEDURE — 6370000000 HC RX 637 (ALT 250 FOR IP)

## 2024-10-24 RX ORDER — DIVALPROEX SODIUM 500 MG/1
500 TABLET, DELAYED RELEASE ORAL 3 TIMES DAILY
COMMUNITY
End: 2024-10-24

## 2024-10-24 RX ORDER — DIVALPROEX SODIUM 500 MG/1
500 TABLET, DELAYED RELEASE ORAL 3 TIMES DAILY
Qty: 90 TABLET | Refills: 0 | Status: SHIPPED | OUTPATIENT
Start: 2024-10-24

## 2024-10-24 RX ADMIN — Medication 3 ML: at 00:05

## 2024-10-24 ASSESSMENT — PAIN DESCRIPTION - DESCRIPTORS: DESCRIPTORS: PATIENT UNABLE TO DESCRIBE

## 2024-10-24 ASSESSMENT — PAIN SCALES - GENERAL: PAINLEVEL_OUTOF10: 0

## 2024-10-24 NOTE — ED PROVIDER NOTES
EKG Interpretation #1    Interpreted by emergency department physician  Time performed: 2322  Time read: 2328    Rhythm: Sinus  Ventricular Rate: 88  QRS Axis: 51  Ectopy: None  Conduction: Normal sinus rhythm with LVH by voltage and early repolarization abnormalities with an RSR'S' in V1.  ST Segments: Consistent with early repolarization abnormalities  T Waves: Consistent with early repolarization abnormalities  Q Waves: None noted    Other findings: Motion artifact but EKG is readable    Compared to EKG on: 4/16/2018 and appears unchanged    Clinical Impression: Normal sinus rhythm with LVH by voltage and early repolarization abnormalities with an RSR'S' in V1.  There is motion artifact but EKG is readable.  This is compared to an EKG on 4/16/2018 and appears unchanged.    DO Mady CASANOVA Charles K, DO  10/23/24 7172

## 2024-10-24 NOTE — ED PROVIDER NOTES
[facility] EMERGENCY DEPARTMENT  EMERGENCY DEPARTMENT ENCOUNTER        Pt Name: Rayray Weber  MRN: [id number]  Birthdate 1995  Date of evaluation: 10/23/2024  Provider: PHILLIP Paulson CNP  PCP: No primary care provider on file.  Note Started: 11:22 PM EDT 10/23/24      NEREIDA. I have evaluated this patient.        CHIEF COMPLAINT       Chief Complaint   Patient presents with    Seizures     Pt to ed via ems c/o seizure. Pt states he started washing and everything went blank. EMS states Pt ws found by parents on the floor in a broken glass table .  Pt presents to ed with laceration on right hand and lower lip. Pt  states he is not compliant with seizure medications and his last seizure was 3-4 months ago.       HISTORY OF PRESENT ILLNESS: 1 or more Elements     History From: ems,Pt    Limitations to history : None    Social Determinants Significantly Affecting Health : Patient has significant healthcare illiteracy    Chief Complaint:above    Rayray Weber is a 29 y.o. male who  has a past medical history of Seizures ([facility]). presents to ED with reported seizure at home at which point he was found the ground by broken glass table with laceration underneath his lower lip as well as his right hand.  Left hand dominant.  Tdap unsure.  Noncompliant with Depakote.  Last seizure 3 months ago.  Has not followed up with neurology or PCP.    The patient  reports that he has been smoking cigarettes. He has never used smokeless tobacco. He reports current drug use. Drug: Marijuana (Weed). He reports that he does not drink alcohol.     Nursing Notes were all reviewed and agreed with or any disagreements were addressed in the HPI.    REVIEW OF SYSTEMS :      Review of Systems    Positives and Pertinent negatives as per HPI.     SURGICAL HISTORY   History reviewed. No pertinent surgical history.    CURRENTMEDICATIONS       Discharge Medication List as of 10/24/2024  1:50 AM        CONTINUE these

## 2024-10-24 NOTE — ED TRIAGE NOTES
Pt to ed via ems c/o seizure. Pt states he started washing and everything went blank. EMS states Pt ws found by parents on the floor in a broken glass table .  Pt presents to ed with laceration on right hand and lower lip. Pt  states he is not compliant with seizure medications and his last seizure was 3-4 months ago.

## 2024-10-24 NOTE — TELEPHONE ENCOUNTER
Left message for patient to return call if they would like to schedule a follow up appointment.Upon return call please schedule appt with Dr. Altamirano

## 2024-10-24 NOTE — DISCHARGE INSTRUCTIONS
It is very important that you follow-up with a family doctor and take your seizure medication as prescribed.  Return to ED for any worsening symptoms as discussed.  Please follow-up with a hand surgeon as we discussed for your hand injury  
no anaphylaxis

## 2024-11-21 ENCOUNTER — HOSPITAL ENCOUNTER (EMERGENCY)
Age: 29
Discharge: HOME | End: 2024-11-21
Attending: STUDENT IN AN ORGANIZED HEALTH CARE EDUCATION/TRAINING PROGRAM
Payer: MEDICAID

## 2024-11-21 VITALS
DIASTOLIC BLOOD PRESSURE: 78 MMHG | WEIGHT: 188.71 LBS | RESPIRATION RATE: 16 BRPM | HEART RATE: 71 BPM | OXYGEN SATURATION: 98 % | SYSTOLIC BLOOD PRESSURE: 117 MMHG | TEMPERATURE: 98.8 F | BODY MASS INDEX: 25.01 KG/M2 | HEIGHT: 73 IN

## 2024-11-21 DIAGNOSIS — Z48.02 VISIT FOR SUTURE REMOVAL: Primary | ICD-10-CM

## 2024-11-21 PROCEDURE — 99282 EMERGENCY DEPT VISIT SF MDM: CPT

## 2024-11-21 ASSESSMENT — LIFESTYLE VARIABLES
HOW OFTEN DO YOU HAVE A DRINK CONTAINING ALCOHOL: 2-4 TIMES A MONTH
HOW MANY STANDARD DRINKS CONTAINING ALCOHOL DO YOU HAVE ON A TYPICAL DAY: 1 OR 2

## 2024-11-21 ASSESSMENT — PAIN - FUNCTIONAL ASSESSMENT
PAIN_FUNCTIONAL_ASSESSMENT: 0-10
PAIN_FUNCTIONAL_ASSESSMENT: NONE - DENIES PAIN

## 2024-11-21 ASSESSMENT — PAIN SCALES - GENERAL: PAINLEVEL_OUTOF10: 0

## 2024-11-21 NOTE — ED TRIAGE NOTES
Pt states that he had sutures placed on the top of his right hand and on his chin on 10/24. Pt states that he needs sutures removed. Pt denies any pain, redness, swelling to the areas. Pt denies fever/chills. Pt AAO x 4. VSS.

## 2024-11-22 NOTE — DISCHARGE INSTRUCTIONS
You were seen today in the emergency department for suture removal.  Your sutures were removed.  Is recommend you follow-up with your regular doctor in the next 24 to 48 hours as needed.  Please continue take your medications as prescribed.  Please return to the emergency department experience any further concerning symptoms.

## 2024-11-22 NOTE — ED PROVIDER NOTES
follow-up were provided as well.    ED Medications administered this visit:  (None if blank)  Medications - No data to display      Responses to treatment:       PROCEDURES: (None if blank)  Suture Removal    Date/Time: 11/21/2024 7:07 PM    Performed by: Nick Sheriff DO  Authorized by: Nick Sheriff DO    Consent:     Consent obtained:  Verbal    Consent given by:  Patient  Universal protocol:     Patient identity confirmed:  Verbally with patient  Location:     Location:  Head/neck    Head/neck location:  Chin  Procedure details:     Wound appearance:  No signs of infection    Number of sutures removed:  7  Post-procedure details:     Post-removal:  No dressing applied    Procedure completion:  Tolerated well, no immediate complications  :   A total of 7 sutures were removed, 4 from the dorsum of the right wrist and 3 from the chin    CRITICAL CARE: (None if blank)  I personally saw the patient and independently provided 0 minutes of nonconcurrent critical care out of the total shared critical care time provided     This includes multiple reevaluations, vital sign monitoring, pulse oximetry monitoring, telemetry monitoring, clinical response to the IV medications, reviewing the nursing notes, consultation time, dictation/documentation time, and interpretation of the labwork. (This time excludes time spent performing procedures).       DISCHARGE PRESCRIPTIONS: (None if blank)  Discharge Medication List as of 11/21/2024  7:37 PM            I am the primary clinician of record.     FINAL IMPRESSION      1. Visit for suture removal            DISPOSITION/PLAN   DISPOSITION Decision To Discharge 11/21/2024 07:27:04 PM           OUTPATIENT FOLLOW UP THE PATIENT:  No follow-up provider specified.      Electronically Signed: Nick Sheriff DO, 11/21/24, 11:15 PM    This report has been produced using speech recognition software and may contain errors related to that system including errors in grammar, punctuation,